# Patient Record
Sex: MALE | Race: BLACK OR AFRICAN AMERICAN | Employment: OTHER | URBAN - METROPOLITAN AREA
[De-identification: names, ages, dates, MRNs, and addresses within clinical notes are randomized per-mention and may not be internally consistent; named-entity substitution may affect disease eponyms.]

---

## 2019-02-24 ENCOUNTER — HOSPITAL ENCOUNTER (EMERGENCY)
Age: 24
Discharge: HOME | End: 2019-02-24
Attending: EMERGENCY MEDICINE
Payer: OTHER GOVERNMENT

## 2019-02-24 ENCOUNTER — APPOINTMENT (OUTPATIENT)
Dept: GENERAL RADIOLOGY | Age: 24
End: 2019-02-24
Attending: PHYSICIAN ASSISTANT
Payer: OTHER GOVERNMENT

## 2019-02-24 VITALS
RESPIRATION RATE: 15 BRPM | BODY MASS INDEX: 23.74 KG/M2 | HEART RATE: 67 BPM | SYSTOLIC BLOOD PRESSURE: 128 MMHG | TEMPERATURE: 98.2 F | DIASTOLIC BLOOD PRESSURE: 54 MMHG | OXYGEN SATURATION: 100 % | WEIGHT: 185 LBS | HEIGHT: 74 IN
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
## 2019-02-24 DIAGNOSIS — S61.411A LACERATION OF RIGHT HAND WITHOUT FOREIGN BODY, INITIAL ENCOUNTER: ICD-10-CM

## 2019-02-24 DIAGNOSIS — S61.212A LACERATION OF RIGHT MIDDLE FINGER WITHOUT FOREIGN BODY WITHOUT DAMAGE TO NAIL, INITIAL ENCOUNTER: ICD-10-CM

## 2019-02-24 PROCEDURE — 90471 IMMUNIZATION ADMIN: CPT

## 2019-02-24 PROCEDURE — 74011250636 HC RX REV CODE- 250/636: Performed by: PHYSICIAN ASSISTANT

## 2019-02-24 PROCEDURE — 75810000293 HC SIMP/SUPERF WND  RPR

## 2019-02-24 PROCEDURE — 90715 TDAP VACCINE 7 YRS/> IM: CPT | Performed by: PHYSICIAN ASSISTANT

## 2019-02-24 PROCEDURE — 77030031132 HC SUT NYL COVD -A

## 2019-02-24 PROCEDURE — 74011250637 HC RX REV CODE- 250/637: Performed by: PHYSICIAN ASSISTANT

## 2019-02-24 PROCEDURE — 74011000250 HC RX REV CODE- 250: Performed by: PHYSICIAN ASSISTANT

## 2019-02-24 PROCEDURE — 77030018836 HC SOL IRR NACL ICUM -A

## 2019-02-24 PROCEDURE — 73130 X-RAY EXAM OF HAND: CPT

## 2019-02-24 PROCEDURE — 99283 EMERGENCY DEPT VISIT LOW MDM: CPT

## 2019-02-24 RX ORDER — LIDOCAINE HYDROCHLORIDE 10 MG/ML
10 INJECTION INFILTRATION; PERINEURAL ONCE
Status: COMPLETED | OUTPATIENT
Start: 2019-02-24 | End: 2019-02-24

## 2019-02-24 RX ORDER — CEPHALEXIN 250 MG/1
500 CAPSULE ORAL
Status: COMPLETED | OUTPATIENT
Start: 2019-02-24 | End: 2019-02-24

## 2019-02-24 RX ORDER — CEPHALEXIN 500 MG/1
500 CAPSULE ORAL 3 TIMES DAILY
Qty: 21 CAP | Refills: 0 | Status: SHIPPED | OUTPATIENT
Start: 2019-02-24 | End: 2019-03-03

## 2019-02-24 RX ORDER — BACITRACIN 500 UNIT/G
1 PACKET (EA) TOPICAL
Status: COMPLETED | OUTPATIENT
Start: 2019-02-24 | End: 2019-02-24

## 2019-02-24 RX ADMIN — TETANUS TOXOID, REDUCED DIPHTHERIA TOXOID AND ACELLULAR PERTUSSIS VACCINE, ADSORBED 0.5 ML: 5; 2.5; 8; 8; 2.5 SUSPENSION INTRAMUSCULAR at 17:06

## 2019-02-24 RX ADMIN — CEPHALEXIN 500 MG: 250 CAPSULE ORAL at 18:20

## 2019-02-24 RX ADMIN — LIDOCAINE HYDROCHLORIDE 10 ML: 10 INJECTION, SOLUTION INFILTRATION; PERINEURAL at 17:07

## 2019-02-24 RX ADMIN — BACITRACIN 1 PACKET: 500 OINTMENT TOPICAL at 18:13

## 2019-02-24 NOTE — ED PROVIDER NOTES
EMERGENCY DEPARTMENT HISTORY AND PHYSICAL EXAM 
 
Date: 2/24/2019 Patient Name: Ramesh Pedersen History of Presenting Illness Chief Complaint Patient presents with  Laceration  Hand Injury History Provided By: Patient Chief Complaint: right hand injury Duration: 10 Hours Timing:  Acute Location: right hand Quality: Burning Severity: Moderate Modifying Factors: none Associated Symptoms: denies any other associated signs or symptoms HPI: Ramesh Pedersen is a 21 y.o. male with a PMH of No significant past medical history who presents to the ER c/o right hand injury after falling onto glass around 0600 this morning. Patient states he was running, tripped and fell onto some glass injuring his right hand. Patient is unsure when his last tetanus was. He does not take any blood thinners. He denied any numbness, tingling, and has no other symptoms or complaints. PCP: Vidhya, Not On File Current Facility-Administered Medications Medication Dose Route Frequency Provider Last Rate Last Dose  cephALEXin (KEFLEX) capsule 500 mg  500 mg Oral NOW Lauren MOON PA-C Current Outpatient Medications Medication Sig Dispense Refill  cephALEXin (KEFLEX) 500 mg capsule Take 1 Cap by mouth three (3) times daily for 7 days. 21 Cap 0 Past History Past Medical History: 
History reviewed. No pertinent past medical history. Past Surgical History: 
History reviewed. No pertinent surgical history. Family History: 
History reviewed. No pertinent family history. Social History: 
Social History Tobacco Use  Smoking status: Never Smoker  Smokeless tobacco: Never Used Substance Use Topics  Alcohol use: Yes  Drug use: No  
 
 
Allergies: 
No Known Allergies Review of Systems Review of Systems Constitutional: Negative for chills, fatigue and fever. HENT: Negative. Negative for sore throat. Eyes: Negative. Respiratory: Negative for cough and shortness of breath. Cardiovascular: Negative for chest pain and palpitations. Gastrointestinal: Negative for abdominal pain, nausea and vomiting. Genitourinary: Negative for dysuria. Musculoskeletal: Negative. Skin: Positive for wound. Several lacerations to right hand Neurological: Negative for dizziness, weakness, light-headedness and headaches. Psychiatric/Behavioral: Negative. All other systems reviewed and are negative. Physical Exam  
 
Vitals:  
 02/24/19 1610 BP: 130/67 Pulse: 80 Resp: 16 Temp: 98.2 °F (36.8 °C) SpO2: 100% Weight: 83.9 kg (185 lb) Height: 6' 2\" (1.88 m) Physical Exam  
Constitutional: He is oriented to person, place, and time. He appears well-developed and well-nourished. No distress. HENT:  
Head: Normocephalic and atraumatic. Mouth/Throat: Oropharynx is clear and moist.  
Eyes: Conjunctivae are normal. No scleral icterus. Neck: Neck supple. No JVD present. No tracheal deviation present. Cardiovascular: Normal rate, regular rhythm and normal heart sounds. No murmur heard. Pulmonary/Chest: Effort normal and breath sounds normal. No respiratory distress. He has no wheezes. Abdominal: Soft. There is no tenderness. Musculoskeletal: Normal range of motion. Right hand: He exhibits tenderness and laceration. He exhibits normal range of motion, normal capillary refill and no swelling. Normal sensation noted. Normal strength noted. Hands: All digits with FROM with flexion, extension, normal cap refill. Increased pain with flexion of hand into fist.  No signs of tendon involvement. Strong radial pulses BL. FROM right thumb in all 4 directions Neurological: He is alert and oriented to person, place, and time. He has normal strength. Gait normal. GCS eye subscore is 4. GCS verbal subscore is 5. GCS motor subscore is 6. Skin: Skin is warm and dry. He is not diaphoretic. Psychiatric: He has a normal mood and affect. Nursing note and vitals reviewed. Diagnostic Study Results Labs - No results found for this or any previous visit (from the past 12 hour(s)). Radiologic Studies -  
XR HAND RT MIN 3 V    (Results Pending) CT Results  (Last 48 hours) None CXR Results  (Last 48 hours) None Medical Decision Making I am the first provider for this patient. I reviewed the vital signs, available nursing notes, past medical history, past surgical history, family history and social history. Vital Signs-Reviewed the patient's vital signs. Records Reviewed: Nursing Notes and Old Medical Records 4:21 PM 
22 y/o male who presents to the ER c/o multiple lacerations to right hand after tripping and falling onto glass around 0600 this morning. Denied hitting his head and no LOC. Unsure of last tetanus. Will plan on xrays and appropriate wound closure. Luis Felipe Gamble PA-C 
  
6:15 PM 
No acute bony abnormality noted on xray images. No signs of foreign bodies. Wounds cleansed and closed appropriately. Discussed wound care with pt and given return precautions. Will cover with keflex due to significance of injuries to prevent infection. All questions answered and patient in agreement with plan of care. Will plan for discharge. Luis Felipe Gamble PA-C Disposition: 
Discharged DISCHARGE NOTE:  
 
  Care plan outlined and precautions discussed. Patient has no new complaints, changes, or physical findings. Results of xray were reviewed with the patient. All medications were reviewed with the patient; will d/c home with keflex. All of pt's questions and concerns were addressed. Patient was instructed and agrees to follow up with ER or PCP, as well as to return to the ED upon further deterioration. Patient is ready to go home. Follow-up Information Follow up With Specialties Details Why Contact Info [facility] EMERGENCY DEPT Emergency Medicine  If symptoms worsen [address] 
[phone] [facility] EMERGENCY DEPT Emergency Medicine In 1 week For suture removal [address] 
[phone] Current Discharge Medication List  
  
START taking these medications Details  
cephALEXin (KEFLEX) 500 mg capsule Take 1 Cap by mouth three (3) times daily for 7 days. Qty: 21 Cap, Refills: 0 Provider Notes (Medical Decision Making):  
 
Procedures: 
Wound Repair 
Date/Time: 2/24/2019 6:06 PM 
Performed by: PAPreparation: skin prepped with Shur-Clens Pre-procedure re-eval: Immediately prior to the procedure, the patient was reevaluated and found suitable for the planned procedure and any planned medications. Time out: Immediately prior to the procedure a time out was called to verify the correct patient, procedure, equipment, staff and marking as appropriate. Jaida Loose Location details: right hand (4 cm jagged lac to hand between thumb and index) Wound length:2.6 - 7.5 cm (4 cm) Anesthesia: local infiltration Anesthesia: 
Local Anesthetic: lidocaine 1% without epinephrine Anesthetic total: 6 mL Foreign bodies: no foreign bodies Irrigation solution: saline Irrigation method: syringe Debridement: none Skin closure: 4-0 nylon Number of sutures: 7 Technique: simple and interrupted Approximation: close Dressing: antibiotic ointment and 4x4 Patient tolerance: Patient tolerated the procedure well with no immediate complications My total time at bedside, performing this procedure was 16-30 minutes (20). Wound Repair 
Date/Time: 2/24/2019 6:08 PM 
Performed by: PAPreparation: skin prepped with Shur-Clens Pre-procedure re-eval: Immediately prior to the procedure, the patient was reevaluated and found suitable for the planned procedure and any planned medications.  
Time out: Immediately prior to the procedure a time out was called to verify the correct patient, procedure, equipment, staff and marking as appropriate. Naoma Broccoli Location details: right long finger Wound length:2.5 cm or less (v shaped 3 cm laceration distal tip) Anesthesia: local infiltration Anesthesia: 
Local Anesthetic: lidocaine 1% without epinephrine Anesthetic total: 4 mL Foreign bodies: no foreign bodies Irrigation solution: saline Irrigation method: syringe Debridement: none Skin closure: 4-0 nylon Number of sutures: 5 Technique: simple and interrupted Approximation: close Dressing: 4x4 and antibiotic ointment My total time at bedside, performing this procedure was 16-30 minutes (20). Wound Repair 
Date/Time: 2/24/2019 6:08 PM 
Performed by: PAPreparation: skin prepped with Shur-Clens Pre-procedure re-eval: Immediately prior to the procedure, the patient was reevaluated and found suitable for the planned procedure and any planned medications. Time out: Immediately prior to the procedure a time out was called to verify the correct patient, procedure, equipment, staff and marking as appropriate. Naoma Broccoli Location details: right hand Wound length:2.5 cm or less (1.5 cm) Anesthesia: local infiltration Anesthesia: 
Local Anesthetic: lidocaine 1% without epinephrine Anesthetic total: 2 mL Foreign bodies: no foreign bodies Irrigation solution: saline Irrigation method: syringe Debridement: none Skin closure: 4-0 nylon Number of sutures: 3 Technique: simple and interrupted Approximation: close Dressing: 4x4 and antibiotic ointment Patient tolerance: Patient tolerated the procedure well with no immediate complications My total time at bedside, performing this procedure was 1-15 minutes (10). Diagnosis Clinical Impression: 1. Fall, initial encounter 2. Laceration of right hand without foreign body, initial encounter 3. Laceration of right middle finger without foreign body without damage to nail, initial encounter